# Patient Record
Sex: MALE | Race: WHITE | URBAN - METROPOLITAN AREA
[De-identification: names, ages, dates, MRNs, and addresses within clinical notes are randomized per-mention and may not be internally consistent; named-entity substitution may affect disease eponyms.]

---

## 2023-12-24 ENCOUNTER — HOSPITAL ENCOUNTER (EMERGENCY)
Facility: HOSPITAL | Age: 43
Discharge: HOME | End: 2023-12-24
Attending: INTERNAL MEDICINE
Payer: MEDICAID

## 2023-12-24 VITALS
HEIGHT: 65 IN | RESPIRATION RATE: 18 BRPM | HEART RATE: 95 BPM | OXYGEN SATURATION: 98 % | WEIGHT: 250 LBS | DIASTOLIC BLOOD PRESSURE: 87 MMHG | SYSTOLIC BLOOD PRESSURE: 155 MMHG | BODY MASS INDEX: 41.65 KG/M2 | TEMPERATURE: 97.2 F

## 2023-12-24 DIAGNOSIS — R33.9 URINARY RETENTION: Primary | ICD-10-CM

## 2023-12-24 LAB
APPEARANCE UR: CLEAR
BILIRUB UR STRIP.AUTO-MCNC: NEGATIVE MG/DL
COLOR UR: YELLOW
GLUCOSE UR STRIP.AUTO-MCNC: NEGATIVE MG/DL
KETONES UR STRIP.AUTO-MCNC: NEGATIVE MG/DL
LEUKOCYTE ESTERASE UR QL STRIP.AUTO: NEGATIVE
NITRITE UR QL STRIP.AUTO: NEGATIVE
PH UR STRIP.AUTO: 6 [PH]
PROT UR STRIP.AUTO-MCNC: NEGATIVE MG/DL
RBC # UR STRIP.AUTO: NEGATIVE /UL
SP GR UR STRIP.AUTO: 1.01
UROBILINOGEN UR STRIP.AUTO-MCNC: <2 MG/DL

## 2023-12-24 PROCEDURE — 99283 EMERGENCY DEPT VISIT LOW MDM: CPT | Performed by: INTERNAL MEDICINE

## 2023-12-24 PROCEDURE — 51798 US URINE CAPACITY MEASURE: CPT

## 2023-12-24 PROCEDURE — 81003 URINALYSIS AUTO W/O SCOPE: CPT

## 2023-12-24 ASSESSMENT — PAIN SCALES - GENERAL
PAINLEVEL_OUTOF10: 0 - NO PAIN
PAINLEVEL_OUTOF10: 0 - NO PAIN

## 2023-12-24 ASSESSMENT — PAIN - FUNCTIONAL ASSESSMENT
PAIN_FUNCTIONAL_ASSESSMENT: 0-10
PAIN_FUNCTIONAL_ASSESSMENT: 0-10

## 2023-12-24 ASSESSMENT — LIFESTYLE VARIABLES: REASON UNABLE TO ASSESS: NO

## 2023-12-24 ASSESSMENT — PAIN DESCRIPTION - PROGRESSION: CLINICAL_PROGRESSION: NOT CHANGED

## 2023-12-24 NOTE — ED PROVIDER NOTES
HPI   Chief Complaint   Patient presents with    Urinary Retention     Pt presents to the ER for a urine test, states he was pulled over for [place] and could not urinate and wants to have urine drug test now since he can urinate now. Hx kidney stones.also requests to talk to thrive       Kenan is a 43-year-old male with a past medical history of nephrolithiasis followed by urology presenting to the emergency department for a urinary sample.  States that he was pulled over by police earlier tonight for swerving on the road.  They want him to provide a urine sample, but he could not in front of them.  Tried several times, but continue to fail.  Police said if he did not provide a sample that he would lose his license for a year.  If you were to provide a sample, he would only lose it for 3 months.  He does admit to driving altered and took a cannabis gummy earlier today.  Immediately upon arrival to the emergency department, he urinated in the waiting room.  After he urinated without collection, he was roomed.  He is not having any urinary symptoms of dysuria, hematuria, increased frequency, abdominal pain, nausea, vomiting, chest pain, shortness of breath.  The patient is only here to give a urine sample.  He is drinking water in attempts to go again as he already went in the waiting room.                          Maik Coma Scale Score: 15                  Patient History   No past medical history on file.  No past surgical history on file.  No family history on file.  Social History     Tobacco Use    Smoking status: Not on file    Smokeless tobacco: Not on file   Substance Use Topics    Alcohol use: Not on file    Drug use: Not on file       Physical Exam   ED Triage Vitals [12/24/23 0120]   Temp Heart Rate Resp BP   36.2 °C (97.2 °F) 100 18 155/87      SpO2 Temp src Heart Rate Source Patient Position   98 % -- -- --      BP Location FiO2 (%)     -- --       Physical Exam  Constitutional:       Appearance: Normal  appearance.   HENT:      Mouth/Throat:      Mouth: Mucous membranes are moist.      Pharynx: Oropharynx is clear.   Eyes:      Comments: Conjunctival erythema bilaterally   Cardiovascular:      Rate and Rhythm: Regular rhythm. Tachycardia present.      Pulses: Normal pulses.      Heart sounds: Normal heart sounds.   Pulmonary:      Effort: Pulmonary effort is normal. No respiratory distress.      Breath sounds: Normal breath sounds. No stridor. No wheezing or rhonchi.   Abdominal:      General: Abdomen is flat. There is no distension.      Palpations: Abdomen is soft.      Tenderness: There is no abdominal tenderness. There is no right CVA tenderness, left CVA tenderness, guarding or rebound.   Musculoskeletal:         General: Normal range of motion.   Skin:     General: Skin is warm and dry.   Neurological:      General: No focal deficit present.      Mental Status: He is alert and oriented to person, place, and time.   Psychiatric:         Mood and Affect: Mood normal.         Behavior: Behavior normal.         ED Course & MDM   Diagnoses as of 12/24/23 0249   Urinary retention       Medical Decision Making  Kenan is a 43-year-old male with a past medical history of nephrolithiasis presenting to the Emergency Department to give a urinary sample.  He was pulled over by police earlier tonight and he was unable to provide them a urinary sample.  He came to the emergency room today as he wants to provide a sample so he does not lose his legs use for over a year.  He urinated in the waiting room just prior to being roomed.  Currently he is having no symptoms.    Differentials include urinary retention, urinary tract infection, obstruction.  Urinalysis was ordered.  Patient was able to urinate without issue in the waiting room just prior to being roomed.  When he was roomed, I used a bladder scan which revealed 20 mL of urine in the bladder.  This likely indicates he is not retaining and was able to empty his full  bladder when he used the restroom several minutes prior.  He is currently drinking water and attempts to urinate again and provide a sample.  Patient was able to provide a sample and again has less than 20 mL of urine in his bladder on bladder scan.  He does not feel like he is retaining any urine and continues to have no symptoms.  UA no signs of infection.  Patient is stable for discharge.  Discussed strict return precautions including abdominal pain and fever.  Provided patient occasional urinary retention.  Patient already follows with urology for his history of kidney stones.        Procedure  Procedures     Lynne Merritt PA-C  12/24/23 0256